# Patient Record
Sex: FEMALE | Race: WHITE | Employment: PART TIME | URBAN - METROPOLITAN AREA
[De-identification: names, ages, dates, MRNs, and addresses within clinical notes are randomized per-mention and may not be internally consistent; named-entity substitution may affect disease eponyms.]

---

## 2018-09-19 ENCOUNTER — OFFICE VISIT (OUTPATIENT)
Dept: INTERNAL MEDICINE CLINIC | Facility: CLINIC | Age: 23
End: 2018-09-19
Payer: COMMERCIAL

## 2018-09-19 VITALS
HEIGHT: 65.5 IN | SYSTOLIC BLOOD PRESSURE: 108 MMHG | TEMPERATURE: 98 F | BODY MASS INDEX: 23.36 KG/M2 | OXYGEN SATURATION: 97 % | HEART RATE: 77 BPM | DIASTOLIC BLOOD PRESSURE: 71 MMHG | RESPIRATION RATE: 18 BRPM | WEIGHT: 141.88 LBS

## 2018-09-19 DIAGNOSIS — F32.A CHRONIC DEPRESSIVE DISORDER: ICD-10-CM

## 2018-09-19 DIAGNOSIS — R51.9 HEADACHE DISORDER: ICD-10-CM

## 2018-09-19 DIAGNOSIS — Z00.00 ROUTINE PHYSICAL EXAMINATION: ICD-10-CM

## 2018-09-19 DIAGNOSIS — M54.2 NECK PAIN: ICD-10-CM

## 2018-09-19 DIAGNOSIS — E53.8 VITAMIN B 12 DEFICIENCY: Primary | ICD-10-CM

## 2018-09-19 DIAGNOSIS — E55.9 VITAMIN D DEFICIENCY: ICD-10-CM

## 2018-09-19 PROCEDURE — 99205 OFFICE O/P NEW HI 60 MIN: CPT | Performed by: INTERNAL MEDICINE

## 2018-09-19 PROCEDURE — 99212 OFFICE O/P EST SF 10 MIN: CPT | Performed by: INTERNAL MEDICINE

## 2018-09-19 RX ORDER — MAGNESIUM 200 MG
TABLET ORAL EVERY OTHER DAY
COMMUNITY

## 2018-09-19 NOTE — ASSESSMENT & PLAN NOTE
History of chronic headaches but recently with increasing frequency, lightheadedness as well as worsening depressive disorder. She has been having some suicidal ideation but no suicidal thoughts, intent.   She has had some counseling in the past, she has n

## 2018-09-19 NOTE — PROGRESS NOTES
HPI:    Patient ID: Duy Padilla is a 24year old female. New pt    Hx of migraines-self diagnosed-hx of episodes since childhood but not medically evaluated in the past.    Recently with neck pain and stiffness that has been aggravating the ha. as needed. Disp:  Rfl:    Magnesium 200 MG Oral Tab Take by mouth every other day. Disp:  Rfl:    Cholecalciferol (VITAMIN D3 GUMMIES ADULT OR) Take by mouth every other day.  Disp:  Rfl:      Allergies:  Codeine                 NAUSEA AND VOMITING      09/ This Visit        Unprioritized    Vitamin D deficiency     This has been supplemented in the past, recheck labs have been ordered.          Relevant Orders    VITAMIN D, 25-HYDROXY (Completed)    Vitamin B 12 deficiency - Primary     History of B12 deficie PLATELET (Completed)    COMP METABOLIC PANEL (14) (Completed)    ASSAY, THYROID STIM HORMONE (Completed)    URINALYSIS, ROUTINE (Completed)          Return in about 2 weeks (around 10/3/2018).     PT UNDERSTANDS AND AGREES TO FOLLOW DIRECTIONS AND ADVICE

## 2018-09-19 NOTE — ASSESSMENT & PLAN NOTE
Recurrent episodes of neck pain, stiffness follow-up headaches, photophobia, nausea and vomiting which persisted for 2-3 days. Episodes usually repeat every 2-3 days. This has been affecting her daily functional capacity.   Worsening depression on the delta

## 2018-09-19 NOTE — PATIENT INSTRUCTIONS
Problem List Items Addressed This Visit        Unprioritized    Chronic depressive disorder     Started on Lexapro 5 mg daily. Referral to behavioral health navigator provided.   Advised to request for referral for psychiatrist.  Will follow-up after wes ASSAY, THYROID STIM HORMONE    URINALYSIS, ROUTINE

## 2018-09-19 NOTE — ASSESSMENT & PLAN NOTE
Started on Lexapro 5 mg daily. Referral to behavioral health navigator provided. Advised to request for referral for psychiatrist.  Will follow-up after workup and CAT scan is completed.

## 2018-09-21 LAB
ABSOLUTE BASOPHILS: 40 CELLS/UL (ref 0–200)
ABSOLUTE EOSINOPHILS: 73 CELLS/UL (ref 15–500)
ABSOLUTE LYMPHOCYTES: 2924 CELLS/UL (ref 850–3900)
ABSOLUTE MONOCYTES: 416 CELLS/UL (ref 200–950)
ABSOLUTE NEUTROPHILS: 3148 CELLS/UL (ref 1500–7800)
ALBUMIN/GLOBULIN RATIO: 1.4 (CALC) (ref 1–2.5)
ALBUMIN: 4.2 G/DL (ref 3.6–5.1)
ALKALINE PHOSPHATASE: 52 U/L (ref 33–115)
ALT: 10 U/L (ref 6–29)
APPEARANCE: CLEAR
AST: 12 U/L (ref 10–30)
BASOPHILS: 0.6 %
BILIRUBIN, TOTAL: 0.4 MG/DL (ref 0.2–1.2)
BILIRUBIN: NEGATIVE
BUN: 14 MG/DL (ref 7–25)
CALCIUM: 9.7 MG/DL (ref 8.6–10.2)
CARBON DIOXIDE: 25 MMOL/L (ref 20–32)
CHLORIDE: 104 MMOL/L (ref 98–110)
COLOR: YELLOW
CREATININE: 0.71 MG/DL (ref 0.5–1.1)
EGFR IF AFRICN AM: 138 ML/MIN/1.73M2
EGFR IF NONAFRICN AM: 119 ML/MIN/1.73M2
EOSINOPHILS: 1.1 %
GLOBULIN: 3.1 G/DL (CALC) (ref 1.9–3.7)
GLUCOSE: 95 MG/DL (ref 65–99)
GLUCOSE: NEGATIVE
HEMATOCRIT: 39.6 % (ref 35–45)
HEMOGLOBIN: 13.4 G/DL (ref 11.7–15.5)
KETONES: NEGATIVE
LEUKOCYTE ESTERASE: NEGATIVE
LYMPHOCYTES: 44.3 %
MCH: 30 PG (ref 27–33)
MCHC: 33.8 G/DL (ref 32–36)
MCV: 88.8 FL (ref 80–100)
MONOCYTES: 6.3 %
MPV: 10.1 FL (ref 7.5–12.5)
NEUTROPHILS: 47.7 %
NITRITE: NEGATIVE
OCCULT BLOOD: NEGATIVE
PLATELET COUNT: 349 THOUSAND/UL (ref 140–400)
POTASSIUM: 4.5 MMOL/L (ref 3.5–5.3)
PROTEIN, TOTAL: 7.3 G/DL (ref 6.1–8.1)
PROTEIN: NEGATIVE
RDW: 12.2 % (ref 11–15)
RED BLOOD CELL COUNT: 4.46 MILLION/UL (ref 3.8–5.1)
SODIUM: 137 MMOL/L (ref 135–146)
SPECIFIC GRAVITY: 1.01 (ref 1–1.03)
TSH: 1.34 MIU/L
VITAMIN B12: 354 PG/ML (ref 200–1100)
VITAMIN D, 25-OH, TOTAL: 54 NG/ML (ref 30–100)
WHITE BLOOD CELL COUNT: 6.6 THOUSAND/UL (ref 3.8–10.8)

## 2018-10-01 ENCOUNTER — HOSPITAL ENCOUNTER (OUTPATIENT)
Dept: CT IMAGING | Facility: HOSPITAL | Age: 23
Discharge: HOME | End: 2018-10-01
Attending: INTERNAL MEDICINE
Payer: COMMERCIAL

## 2018-10-01 DIAGNOSIS — F32.A CHRONIC DEPRESSIVE DISORDER: ICD-10-CM

## 2018-10-01 DIAGNOSIS — R51.9 HEADACHE DISORDER: ICD-10-CM

## 2018-10-01 DIAGNOSIS — M54.2 NECK PAIN: ICD-10-CM

## 2018-10-01 PROCEDURE — 82565 ASSAY OF CREATININE: CPT

## 2018-10-01 PROCEDURE — 70470 CT HEAD/BRAIN W/O & W/DYE: CPT | Performed by: INTERNAL MEDICINE

## 2018-10-04 ENCOUNTER — OFFICE VISIT (OUTPATIENT)
Dept: OBGYN CLINIC | Facility: CLINIC | Age: 23
End: 2018-10-04
Payer: COMMERCIAL

## 2018-10-04 ENCOUNTER — OFFICE VISIT (OUTPATIENT)
Dept: INTERNAL MEDICINE CLINIC | Facility: CLINIC | Age: 23
End: 2018-10-04
Payer: COMMERCIAL

## 2018-10-04 VITALS
WEIGHT: 146 LBS | HEART RATE: 76 BPM | SYSTOLIC BLOOD PRESSURE: 112 MMHG | BODY MASS INDEX: 24 KG/M2 | DIASTOLIC BLOOD PRESSURE: 72 MMHG

## 2018-10-04 VITALS
HEART RATE: 76 BPM | TEMPERATURE: 98 F | HEIGHT: 65.5 IN | DIASTOLIC BLOOD PRESSURE: 72 MMHG | RESPIRATION RATE: 18 BRPM | WEIGHT: 145.81 LBS | SYSTOLIC BLOOD PRESSURE: 109 MMHG | BODY MASS INDEX: 24 KG/M2

## 2018-10-04 DIAGNOSIS — R51.9 HEADACHE DISORDER: Primary | ICD-10-CM

## 2018-10-04 DIAGNOSIS — F32.A CHRONIC DEPRESSIVE DISORDER: ICD-10-CM

## 2018-10-04 DIAGNOSIS — Z30.09 BIRTH CONTROL COUNSELING: Primary | ICD-10-CM

## 2018-10-04 PROCEDURE — 99212 OFFICE O/P EST SF 10 MIN: CPT | Performed by: INTERNAL MEDICINE

## 2018-10-04 PROCEDURE — 99214 OFFICE O/P EST MOD 30 MIN: CPT | Performed by: INTERNAL MEDICINE

## 2018-10-04 PROCEDURE — 99203 OFFICE O/P NEW LOW 30 MIN: CPT | Performed by: OBSTETRICS & GYNECOLOGY

## 2018-10-04 RX ORDER — ESCITALOPRAM OXALATE 5 MG/1
5 TABLET ORAL DAILY
Qty: 30 TABLET | Refills: 4 | Status: SHIPPED | OUTPATIENT
Start: 2018-10-04 | End: 2019-08-22

## 2018-10-04 NOTE — PROGRESS NOTES
Thompson Escudero is a 24year old female [facility] Patient's last menstrual period was 09/17/2018 (exact date). Patient presents with:      Patient presents today to establish care. She had annual exam with Pap in May.  She states she did not like her GYN an Not on file    Social History Narrative      Not on file      MEDICATIONS:    Current Outpatient Medications:   •  Etonogestrel-Ethinyl Estradiol ([address]), Place vaginally. , Disp: , Rfl:   •  Aspirin-Acetaminophen-Caffeine (EXCEDRIN OR), Take by mouth out of the uterus/expulsion after placement were mentioned. Pt is aware that IUDs do not prevent STIs. Pt is also aware that she would need to use condoms until her follow up appointment. All questions were answered.     I spent 30 minutes on the patient

## 2018-10-05 ENCOUNTER — TELEPHONE (OUTPATIENT)
Dept: OBGYN CLINIC | Facility: CLINIC | Age: 23
End: 2018-10-05

## 2018-10-05 NOTE — ASSESSMENT & PLAN NOTE
Blood pressure 109/72, pulse 76, temperature 98.2 °F (36.8 °C), temperature source Oral, resp. rate 18, height 5' 5.5\" (1.664 m), weight 145 lb 12.8 oz (66.1 kg), last menstrual period 09/17/2018, not currently breastfeeding.   Headache continues to be an

## 2018-10-05 NOTE — ASSESSMENT & PLAN NOTE
Patient has not yet started on Lexapro as this was accidentally not sent in at her last office visit. Medication is being sent in today. Patient has been contacted by the behavioral health navigator and is being set up for a visit with a counselor.

## 2018-10-05 NOTE — PATIENT INSTRUCTIONS
Problem List Items Addressed This Visit        Unprioritized    Chronic depressive disorder     Patient has not yet started on Lexapro as this was accidentally not sent in at her last office visit. Medication is being sent in today.   Patient has been cont

## 2018-10-08 NOTE — PROGRESS NOTES
HPI:    Patient ID: Enrique Sánchez is a 24year old female. Headache    This is a recurrent problem. The current episode started more than 1 month ago. The problem occurs intermittently.  The problem has been waxing and waning (was started on Lexapro Allergic/Immunologic: Negative. Neurological: Positive for headaches. Hematological: Negative. Psychiatric/Behavioral: Positive for decreased concentration. The patient is nervous/anxious and has insomnia.              Current Outpatient [facility] to person, place, and time. She has normal reflexes. She displays normal reflexes. No cranial nerve deficit. She exhibits normal muscle tone. Coordination normal.   Skin: No rash noted. No erythema. Psychiatric: She has a normal mood and affect.  Her beha

## 2018-10-17 PROBLEM — F33.1 MAJOR DEPRESSIVE DISORDER, RECURRENT EPISODE, MODERATE (HCC): Status: ACTIVE | Noted: 2018-10-17

## 2018-10-17 PROBLEM — F40.10 SOCIAL ANXIETY DISORDER: Status: ACTIVE | Noted: 2018-10-17

## 2018-10-17 PROBLEM — F41.1 GENERALIZED ANXIETY DISORDER: Status: ACTIVE | Noted: 2018-10-17

## 2019-08-22 ENCOUNTER — OFFICE VISIT (OUTPATIENT)
Dept: INTERNAL MEDICINE CLINIC | Facility: CLINIC | Age: 24
End: 2019-08-22
Payer: COMMERCIAL

## 2019-08-22 ENCOUNTER — NURSE TRIAGE (OUTPATIENT)
Dept: INTERNAL MEDICINE CLINIC | Facility: CLINIC | Age: 24
End: 2019-08-22

## 2019-08-22 VITALS
DIASTOLIC BLOOD PRESSURE: 74 MMHG | BODY MASS INDEX: 25.27 KG/M2 | HEART RATE: 93 BPM | TEMPERATURE: 99 F | HEIGHT: 65.5 IN | SYSTOLIC BLOOD PRESSURE: 115 MMHG | WEIGHT: 153.5 LBS | RESPIRATION RATE: 22 BRPM | OXYGEN SATURATION: 96 %

## 2019-08-22 DIAGNOSIS — R42 DIZZINESS: Primary | ICD-10-CM

## 2019-08-22 PROCEDURE — 99214 OFFICE O/P EST MOD 30 MIN: CPT | Performed by: INTERNAL MEDICINE

## 2019-08-22 RX ORDER — NYSTATIN 100000 U/G
OINTMENT TOPICAL
Refills: 1 | COMMUNITY
Start: 2019-07-17 | End: 2019-08-22 | Stop reason: ALTCHOICE

## 2019-08-22 RX ORDER — MELATONIN
325
COMMUNITY

## 2019-08-22 RX ORDER — ETONOGESTREL/ETHINYL ESTRADIOL .12-.015MG
RING, VAGINAL VAGINAL
Refills: 0 | COMMUNITY
Start: 2019-07-23

## 2019-08-22 NOTE — TELEPHONE ENCOUNTER
Patient is scheduled at 5:30pm.  Ashley Magallon to keep appt if stable. LMTCB if has any chest pains, severe symptoms.

## 2019-08-22 NOTE — ASSESSMENT & PLAN NOTE
7 to 10-day history of dizziness without any associated other problems like infections, fevers or chills, tingling or numbness, palpitations or shortness of breath. No associated change in diet. No recent history of travels.   On examination today she diallo

## 2019-08-22 NOTE — TELEPHONE ENCOUNTER
Pt scheduled via Scopelechart  \"  Appointment For: Alex Palmer ([id number])   Visit Type: June Mora ([id number])      8/22/2019    5:30 PM  30 mins. Antonia Márquez MD         [facility]-INTERNAL MED      Patient Comments:   Noah Laura been very dizzy for about a week

## 2019-08-22 NOTE — PATIENT INSTRUCTIONS
Problem List Items Addressed This Visit        Unprioritized    Dizziness - Primary     7 to 10-day history of dizziness without any associated other problems like infections, fevers or chills, tingling or numbness, palpitations or shortness of breath.   No

## 2019-08-22 NOTE — PROGRESS NOTES
HPI:    Patient ID: Karan Godoy is a 21year old female. onset - 6-7 days ago, pt states she has been almost constantly \"dizzy and woozy\". The intensity varies with activity. Dizziness worse with rapid positional changes/rapid head movements.  Oc Negative. Cardiovascular: Negative. Gastrointestinal: Negative. Endocrine: Negative. Genitourinary: Negative. Musculoskeletal: Negative. Skin: Negative. Allergic/Immunologic: Negative.     Neurological: Positive for dizziness and light- Neurological: She is alert and oriented to person, place, and time. She has normal reflexes. She displays no atrophy and normal reflexes. No cranial nerve deficit or sensory deficit. She exhibits normal muscle tone.  Coordination and gait normal.   Reflex correct symptoms.   May also need a neurology evaluation if it persists         Relevant Orders    CBC WITH DIFFERENTIAL WITH PLATELET (Completed)    COMP METABOLIC PANEL (14) (Completed)    ASSAY, THYROID STIM HORMONE (Completed)    VITAMIN B12 (Completed)

## 2019-08-28 LAB
ABSOLUTE BASOPHILS: 19 CELLS/UL (ref 0–200)
ABSOLUTE EOSINOPHILS: 52 CELLS/UL (ref 15–500)
ABSOLUTE LYMPHOCYTES: 2030 CELLS/UL (ref 850–3900)
ABSOLUTE MONOCYTES: 367 CELLS/UL (ref 200–950)
ABSOLUTE NEUTROPHILS: 2233 CELLS/UL (ref 1500–7800)
ALBUMIN/GLOBULIN RATIO: 1.4 (CALC) (ref 1–2.5)
ALBUMIN: 4.2 G/DL (ref 3.6–5.1)
ALKALINE PHOSPHATASE: 41 U/L (ref 33–115)
ALT: 12 U/L (ref 6–29)
AST: 15 U/L (ref 10–30)
BASOPHILS: 0.4 %
BILIRUBIN, TOTAL: 0.3 MG/DL (ref 0.2–1.2)
BUN: 10 MG/DL (ref 7–25)
CALCIUM: 9.7 MG/DL (ref 8.6–10.2)
CARBON DIOXIDE: 24 MMOL/L (ref 20–32)
CHLORIDE: 103 MMOL/L (ref 98–110)
CREATININE: 0.72 MG/DL (ref 0.5–1.1)
EGFR IF AFRICN AM: 135 ML/MIN/1.73M2
EGFR IF NONAFRICN AM: 116 ML/MIN/1.73M2
EOSINOPHILS: 1.1 %
GLOBULIN: 2.9 G/DL (CALC) (ref 1.9–3.7)
GLUCOSE: 84 MG/DL (ref 65–99)
HEMATOCRIT: 39.9 % (ref 35–45)
HEMOGLOBIN: 13.1 G/DL (ref 11.7–15.5)
LYMPHOCYTES: 43.2 %
MCH: 29.7 PG (ref 27–33)
MCHC: 32.8 G/DL (ref 32–36)
MCV: 90.5 FL (ref 80–100)
MONOCYTES: 7.8 %
MPV: 10.3 FL (ref 7.5–12.5)
NEUTROPHILS: 47.5 %
PLATELET COUNT: 275 THOUSAND/UL (ref 140–400)
POTASSIUM: 4.9 MMOL/L (ref 3.5–5.3)
PROTEIN, TOTAL: 7.1 G/DL (ref 6.1–8.1)
RDW: 11.7 % (ref 11–15)
RED BLOOD CELL COUNT: 4.41 MILLION/UL (ref 3.8–5.1)
SODIUM: 136 MMOL/L (ref 135–146)
TSH: 1.47 MIU/L
VITAMIN B12: 278 PG/ML (ref 200–1100)
WHITE BLOOD CELL COUNT: 4.7 THOUSAND/UL (ref 3.8–10.8)

## 2020-12-10 ENCOUNTER — HOSPITAL ENCOUNTER (OUTPATIENT)
Age: 25
Discharge: HOME | End: 2020-12-10
Payer: COMMERCIAL

## 2020-12-10 VITALS
DIASTOLIC BLOOD PRESSURE: 87 MMHG | RESPIRATION RATE: 16 BRPM | OXYGEN SATURATION: 97 % | SYSTOLIC BLOOD PRESSURE: 150 MMHG | HEART RATE: 108 BPM | TEMPERATURE: 98 F

## 2020-12-10 DIAGNOSIS — R11.2 NAUSEA AND VOMITING, INTRACTABILITY OF VOMITING NOT SPECIFIED, UNSPECIFIED VOMITING TYPE: Primary | ICD-10-CM

## 2020-12-10 PROCEDURE — 96374 THER/PROPH/DIAG INJ IV PUSH: CPT

## 2020-12-10 PROCEDURE — 80047 BASIC METABLC PNL IONIZED CA: CPT

## 2020-12-10 PROCEDURE — 99204 OFFICE O/P NEW MOD 45 MIN: CPT

## 2020-12-10 PROCEDURE — 99214 OFFICE O/P EST MOD 30 MIN: CPT

## 2020-12-10 PROCEDURE — 81002 URINALYSIS NONAUTO W/O SCOPE: CPT

## 2020-12-10 PROCEDURE — 87086 URINE CULTURE/COLONY COUNT: CPT | Performed by: NURSE PRACTITIONER

## 2020-12-10 PROCEDURE — 96375 TX/PRO/DX INJ NEW DRUG ADDON: CPT

## 2020-12-10 PROCEDURE — 81025 URINE PREGNANCY TEST: CPT

## 2020-12-10 PROCEDURE — 85025 COMPLETE CBC W/AUTO DIFF WBC: CPT | Performed by: NURSE PRACTITIONER

## 2020-12-10 RX ORDER — METOCLOPRAMIDE HYDROCHLORIDE 5 MG/ML
10 INJECTION INTRAMUSCULAR; INTRAVENOUS EVERY 6 HOURS PRN
Status: DISCONTINUED | OUTPATIENT
Start: 2020-12-10 | End: 2020-12-10

## 2020-12-10 RX ORDER — METOCLOPRAMIDE HYDROCHLORIDE 5 MG/ML
10 INJECTION INTRAMUSCULAR; INTRAVENOUS ONCE
Status: COMPLETED | OUTPATIENT
Start: 2020-12-10 | End: 2020-12-10

## 2020-12-10 RX ORDER — ONDANSETRON 4 MG/1
4 TABLET, ORALLY DISINTEGRATING ORAL ONCE
Status: COMPLETED | OUTPATIENT
Start: 2020-12-10 | End: 2020-12-10

## 2020-12-10 RX ORDER — ONDANSETRON 2 MG/ML
4 INJECTION INTRAMUSCULAR; INTRAVENOUS ONCE
Status: COMPLETED | OUTPATIENT
Start: 2020-12-10 | End: 2020-12-10

## 2020-12-10 RX ORDER — DIPHENHYDRAMINE HYDROCHLORIDE 50 MG/ML
25 INJECTION INTRAMUSCULAR; INTRAVENOUS ONCE
Status: COMPLETED | OUTPATIENT
Start: 2020-12-10 | End: 2020-12-10

## 2020-12-10 RX ORDER — METOCLOPRAMIDE 10 MG/1
10 TABLET ORAL 3 TIMES DAILY PRN
Qty: 20 TABLET | Refills: 0 | Status: SHIPPED | OUTPATIENT
Start: 2020-12-10 | End: 2021-01-09

## 2020-12-11 NOTE — ED INITIAL ASSESSMENT (HPI)
PATIENT ARRIVED AMBULATORY TO ROOM C/O SYMPTOMS THAT STARTED YESTERDAY. PATIENT WAS SEEN AT [facility] CL[facility] FOR YEAST INFECTION LIKE SYMPTOMS. 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 WHEN I GOT HOME IT STARTED HURTING WHEN I PEED AND I WAS HAVING HEADACHES.

## 2020-12-11 NOTE — ED PROVIDER NOTES
Patient Seen in: [facility]      History   Patient presents with:  Vomiting    Stated Complaint: Vomiting, severe headache    HPI    58-year-old female with known history of migraines here today with nausea, vomiting that started last night. Temp 97.5 °F (36.4 °C) (Temporal)   Resp 16   LMP 12/10/2020 (Exact Date)   SpO2 97%         Physical Exam    Adult physical exam:     VS: Vital signs reviewed.  O2 saturation within normal limits for this patient     General: Patient is awake and alert, or evidence of UTI. Pregnancy test is negative. No vaginal discharge or suprapubic tenderness. pt to return to ED if they develop fever >103, worsened pain, persistent emesis or other new or worsened symptoms        1925- went to assess patient,  She reports s